# Patient Record
Sex: FEMALE | Race: WHITE
[De-identification: names, ages, dates, MRNs, and addresses within clinical notes are randomized per-mention and may not be internally consistent; named-entity substitution may affect disease eponyms.]

---
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
## 2019-01-10 ENCOUNTER — HOSPITAL ENCOUNTER (EMERGENCY)
Dept: HOSPITAL 25 - ED | Age: 19
Discharge: HOME | End: 2019-01-10
Payer: COMMERCIAL

## 2019-01-10 VITALS — SYSTOLIC BLOOD PRESSURE: 115 MMHG | DIASTOLIC BLOOD PRESSURE: 67 MMHG

## 2019-01-10 DIAGNOSIS — N12: Primary | ICD-10-CM

## 2019-01-10 DIAGNOSIS — R10.84: ICD-10-CM

## 2019-01-10 DIAGNOSIS — M54.9: ICD-10-CM

## 2019-01-10 LAB
ALBUMIN SERPL BCG-MCNC: 4.4 G/DL (ref 3.2–5.2)
ALBUMIN/GLOB SERPL: 1.8 {RATIO} (ref 1–3)
ALP SERPL-CCNC: 61 U/L (ref 34–104)
ALT SERPL W P-5'-P-CCNC: 17 U/L (ref 7–52)
ANION GAP SERPL CALC-SCNC: 5 MMOL/L (ref 2–11)
AST SERPL-CCNC: 18 U/L (ref 13–39)
BUN SERPL-MCNC: 10 MG/DL (ref 6–24)
BUN/CREAT SERPL: 14.3 (ref 8–20)
CALCIUM SERPL-MCNC: 9.2 MG/DL (ref 8.6–10.3)
CHLORIDE SERPL-SCNC: 106 MMOL/L (ref 101–111)
GLOBULIN SER CALC-MCNC: 2.4 G/DL (ref 2–4)
GLUCOSE SERPL-MCNC: 103 MG/DL (ref 70–100)
HCO3 SERPL-SCNC: 27 MMOL/L (ref 22–32)
HCT VFR BLD AUTO: 38 % (ref 35–47)
HGB BLD-MCNC: 13.1 G/DL (ref 12–16)
MCH RBC QN AUTO: 30 PG (ref 27–31)
MCHC RBC AUTO-ENTMCNC: 34 G/DL (ref 31–36)
MCV RBC AUTO: 87 FL (ref 80–97)
PLATELET # BLD AUTO: 238 10^3/UL (ref 150–450)
POTASSIUM SERPL-SCNC: 3.5 MMOL/L (ref 3.5–5)
PROT SERPL-MCNC: 6.8 G/DL (ref 6.4–8.9)
RBC # BLD AUTO: 4.39 10^6/UL (ref 4–5.4)
RBC UR QL AUTO: (no result)
SODIUM SERPL-SCNC: 138 MMOL/L (ref 135–145)
WBC # BLD AUTO: 6.4 10^3/UL (ref 3.5–10.8)
WBC UR QL AUTO: (no result)

## 2019-01-10 PROCEDURE — 81015 MICROSCOPIC EXAM OF URINE: CPT

## 2019-01-10 PROCEDURE — 99282 EMERGENCY DEPT VISIT SF MDM: CPT

## 2019-01-10 PROCEDURE — 85027 COMPLETE CBC AUTOMATED: CPT

## 2019-01-10 PROCEDURE — 87186 SC STD MICRODIL/AGAR DIL: CPT

## 2019-01-10 PROCEDURE — 36415 COLL VENOUS BLD VENIPUNCTURE: CPT

## 2019-01-10 PROCEDURE — 83605 ASSAY OF LACTIC ACID: CPT

## 2019-01-10 PROCEDURE — 80053 COMPREHEN METABOLIC PANEL: CPT

## 2019-01-10 PROCEDURE — 87086 URINE CULTURE/COLONY COUNT: CPT

## 2019-01-10 PROCEDURE — 87077 CULTURE AEROBIC IDENTIFY: CPT

## 2019-01-10 PROCEDURE — 81003 URINALYSIS AUTO W/O SCOPE: CPT

## 2019-01-10 NOTE — ED
Abdominal Pain/Female





- HPI Summary


HPI Summary: 


The patient is a 19 y/o F presenting to [facility] with a chief complaint of gradual 

onset right flank pain starting yesterday while she was on plane back from 

[place]. Two days ago, she had increased frequent foul-smelling urine, and it 

was cloudy. She then developed the flank pain and back pain that is worse on 

the left than the right. She denies dysuria and hematuria. The pain is 

currently rated 6/10 in severity. About two weeks ago, she had salmonella, with 

increased diarrhea that was treated with an antidiarrheal. Also about a month 

ago she had increased liver enzymes. She denies hx of UTI and kidney or bladder 

infections. No smoking or EtOH.





- History of Current Complaint


Chief Complaint: EDFlankPain


Stated Complaint: BACK AND ABD PAIN


Time Seen by Provider: 01/10/19 15:45


Hx Obtained From: Patient


Onset/Duration: Sudden Onset, Lasting Days - two, Still Present


Timing: Days


Severity Initially: Mild


Severity Currently: Moderate


Pain Intensity: 6


Pain Scale Used: 0-10 Numeric


Location: Flank - right


Radiates: No


Aggravating Factor(s): Nothing


Alleviating Factor(s): Nothing


Associated Signs and Symptoms: Positive: Back Pain - worse on left than right, 

Other: - POSITIVE: increased urinary frequency with foul-smelling urine; 

NEGATIVE: dysuria, hematuria


Allergies/Adverse Reactions: 


 Allergies











Allergy/AdvReac Type Severity Reaction Status Date / Time


 


No Known Allergies Allergy   Verified 01/10/19 15:27














PMH/Surg Hx/FS Hx/Imm Hx


Endocrine/Hematology History: 


   Denies: Hx Diabetes


Respiratory History: 


   Denies: Hx Chronic Obstructive Pulmonary Disease (COPD)


 History: 


   Denies: Hx Kidney Infection


Sensory History: 


   Denies: Hx Eye Prosthesis, Hx Legally Blind


Opthamlomology History: 


   Denies: Hx Eye Prosthesis, Hx Legally Blind





- Surgical History


Surgery Procedure, Year, and Place: none


Infectious Disease History: No


Infectious Disease History: Reports: Traveled Outside the [place] in Last 30 Days





- Family History


Known Family History: Positive: Cardiac Disease





- Social History


Occupation: Student


Lives: Dormitory/Roommates


Alcohol Use: Rare


Hx Substance Use: No


Substance Use Type: Reports: None


Hx Tobacco Use: No


Smoking Status (MU): Never Smoked Tobacco





Review of Systems


Negative: Fever, Chills


Negative: Erythema


Negative: Sore Throat


Negative: Chest Pain


Negative: Shortness Of Breath, Cough


Negative: Vomiting, Nausea


Positive: frequency - increased, flank pain - right flank, other - foul-

smelling urine.  Negative: dysuria, hematuria


Positive: Other - worse on left back but also on right.  Negative: Myalgia, 

Edema


Negative: Rash


Neurological: Other - NEGATIVE: dizziness


All Other Systems Reviewed And Are Negative: Yes





Physical Exam





- Summary


Physical Exam Summary: 


Constitutional: Well-developed, Well-nourished, Alert. (-) Distressed


Skin: Warm, Dry


HENT: Normocephalic; Atraumatic


Eyes: Conjunctiva normal


Neck: Musculoskeletal ROM normal neck. (-) JVD, (-) Stridor, (-) Tracheal 

deviation


Cardio: Rhythm regular, rate normal, Heart sounds normal; Intact distal pulses; 

The pedal pulses are 2+ and symmetric. Radial pulses are 2+ and symmetric. (-) 

Murmur


Pulmonary/Chest wall: Effort normal. (-) Respiratory distress, (-) Wheezes, (-) 

Rales


Abd: Soft, Mild right flank tenderness, (-) epigastric tenderness, (-) 

Distension, (-) Guarding, (-) Rebound


Musculoskeletal: (-) Edema, Mild TVA tenderness bilaterally


Lymph: (-) Cervical adenopathy


Neuro: Alert, Oriented x3


Psych: Mood and affect Normal


Triage Information Reviewed: Yes


Vital Signs On Initial Exam: 


 Initial Vitals











Temp Pulse Resp BP Pulse Ox


 


 98.1 F   75   14   126/79   100 


 


 01/10/19 15:22  01/10/19 15:22  01/10/19 15:22  01/10/19 15:22  01/10/19 15:22











Vital Signs Reviewed: Yes





Diagnostics





- Vital Signs


 Vital Signs











  Temp Pulse Resp BP Pulse Ox


 


 01/10/19 15:22  98.1 F  75  14  126/79  100














- Laboratory


Lab Results: 


 Lab Results











  01/10/19 01/10/19 01/10/19 Range/Units





  16:26 16:26 16:26 


 


WBC  6.4    (3.5-10.8)  10^3/ul


 


RBC  4.39    (4.00-5.40)  10^6/ul


 


Hgb  13.1    (12.0-16.0)  g/dl


 


Hct  38    (35-47)  %


 


MCV  87    (80-97)  fL


 


MCH  30    (27-31)  pg


 


MCHC  34    (31-36)  g/dl


 


RDW  13    (10.5-15)  %


 


Plt Count  238    (150-450)  10^3/ul


 


MPV  8.0    (7.4-10.4)  fL


 


Sodium   138   (135-145)  mmol/L


 


Potassium   3.5   (3.5-5.0)  mmol/L


 


Chloride   106   (101-111)  mmol/L


 


Carbon Dioxide   27   (22-32)  mmol/L


 


Anion Gap   5   (2-11)  mmol/L


 


BUN   10   (6-24)  mg/dL


 


Creatinine   0.70   (0.51-0.95)  mg/dL


 


Est GFR ( Amer)   130.4   (>60)  


 


Est GFR (Non-Af Amer)   107.8   (>60)  


 


BUN/Creatinine Ratio   14.3   (8-20)  


 


Glucose   103 H   ()  mg/dL


 


Lactic Acid    1.0  (0.5-2.0)  mmol/L


 


Calcium   9.2   (8.6-10.3)  mg/dL


 


Total Bilirubin   0.30   (0.2-1.0)  mg/dL


 


AST   18   (13-39)  U/L


 


ALT   17   (7-52)  U/L


 


Alkaline Phosphatase   61   ()  U/L


 


Total Protein   6.8   (6.4-8.9)  g/dL


 


Albumin   4.4   (3.2-5.2)  g/dL


 


Globulin   2.4   (2-4)  g/dL


 


Albumin/Globulin Ratio   1.8   (1-3)  


 


Urine Color     


 


Urine Appearance     


 


Urine pH     (5-9)  


 


Ur Specific Gravity     (1.010-1.030)  


 


Urine Protein     (Negative)  


 


Urine Ketones     (Negative)  


 


Urine Blood     (Negative)  


 


Urine Nitrate     (Negative)  


 


Urine Bilirubin     (Negative)  


 


Urine Urobilinogen     (Negative)  


 


Ur Leukocyte Esterase     (Negative)  


 


Urine WBC (Auto)     (Absent)  


 


Urine RBC (Auto)     (Absent)  


 


Ur Squamous Epith Cells     (Absent)  


 


Urine Bacteria     (Absent)  


 


Urine Glucose     (Negative)  














  01/10/19 Range/Units





  16:39 


 


WBC   (3.5-10.8)  10^3/ul


 


RBC   (4.00-5.40)  10^6/ul


 


Hgb   (12.0-16.0)  g/dl


 


Hct   (35-47)  %


 


MCV   (80-97)  fL


 


MCH   (27-31)  pg


 


MCHC   (31-36)  g/dl


 


RDW   (10.5-15)  %


 


Plt Count   (150-450)  10^3/ul


 


MPV   (7.4-10.4)  fL


 


Sodium   (135-145)  mmol/L


 


Potassium   (3.5-5.0)  mmol/L


 


Chloride   (101-111)  mmol/L


 


Carbon Dioxide   (22-32)  mmol/L


 


Anion Gap   (2-11)  mmol/L


 


BUN   (6-24)  mg/dL


 


Creatinine   (0.51-0.95)  mg/dL


 


Est GFR ( Amer)   (>60)  


 


Est GFR (Non-Af Amer)   (>60)  


 


BUN/Creatinine Ratio   (8-20)  


 


Glucose   ()  mg/dL


 


Lactic Acid   (0.5-2.0)  mmol/L


 


Calcium   (8.6-10.3)  mg/dL


 


Total Bilirubin   (0.2-1.0)  mg/dL


 


AST   (13-39)  U/L


 


ALT   (7-52)  U/L


 


Alkaline Phosphatase   ()  U/L


 


Total Protein   (6.4-8.9)  g/dL


 


Albumin   (3.2-5.2)  g/dL


 


Globulin   (2-4)  g/dL


 


Albumin/Globulin Ratio   (1-3)  


 


Urine Color  Yellow  


 


Urine Appearance  Cloudy  


 


Urine pH  6.0  (5-9)  


 


Ur Specific Gravity  1.013  (1.010-1.030)  


 


Urine Protein  Negative  (Negative)  


 


Urine Ketones  Negative  (Negative)  


 


Urine Blood  2+ A  (Negative)  


 


Urine Nitrate  Positive A  (Negative)  


 


Urine Bilirubin  Negative  (Negative)  


 


Urine Urobilinogen  Negative  (Negative)  


 


Ur Leukocyte Esterase  1+ A  (Negative)  


 


Urine WBC (Auto)  3+(>20/hpf) A  (Absent)  


 


Urine RBC (Auto)  3+(>10/hpf) A  (Absent)  


 


Ur Squamous Epith Cells  Present A  (Absent)  


 


Urine Bacteria  3+ A  (Absent)  


 


Urine Glucose  Negative  (Negative)  











Result Diagrams: 


 01/10/19 16:26





 01/10/19 16:26


Lab Statement: Any lab studies that have been ordered have been reviewed, and 

results considered in the medical decision making process.





Re-Evaluation





- Re-Evaluation


  ** First Eval


Re-Evaluation Time: 17:30


Change: Unchanged


Comment: I spoke with the patient concering discharge home and follow up at 

[facility].





Abdominal Pain Fem Course/Dx





- Course


Course Of Treatment: The patient is a 19 y/o F presenting to [facility] with a chief 

complaint of gradual onset right flank pain starting yesterday. Two days ago, 

she had increased frequent foul-smelling urine, and it was cloudy. She then 

developed the flank pain and back pain that is worse on the left than the 

right. She denies dysuria and hematuria. About two weeks ago, she had salmonella

, with increased diarrhea that was treated with an antidiarrheal. Also about a 

month ago she had increased liver enzymes. She denies hx of UTI and kidney or 

bladder infections. Upon physical exam, the patient exhibits mild TVA 

tenderness bilaterally and mild right flank tenderness. In the ED course, the 

patient was given Ibuprofen and Bactrim. Bloodwork is unremarkable. UA reveals 

blood, nitrate, leukocytes, WBCs, RBCs, and bacteria. With these results, the 

patient is diagnosed with pyelonephritis. She will be discharged home with 

prescription for Bactrim, education instructions, and follow up with [facility] in 1-2 days. She agrees with this plan and understands the need for 

return to the ED if symptoms worsen.





- Diagnoses


Provider Diagnoses: 


 Pyelonephritis








Discharge





- Sign-Out/Discharge


Documenting (check all that apply): Patient Departure - Patient will be 

discharged home.





- Discharge Plan


Condition: Stable


Disposition: HOME


Prescriptions: 


Sulfamethox/Trimethoprim DS* [Bactrim /160 TAB*] 1 tab PO BID #14 tab


Patient Education Materials:  Kidney Infection (ED)


Print Language: ENGLISH


Referrals: 


Andres Spears MD [Primary Care Provider] - 


[facility] [Provider Group] - 2 Days


Additional Instructions: 


Please take medication as prescribed. 





Follow up with [facility] in 1-2 days.





RETURN TO THE EMERGENCY DEPARTMENT FOR ANY NEW OR WORSENING SYMPTOMS.





- Billing Disposition and Condition


Condition: STABLE


Disposition: Home





- Attestation Statements


Document Initiated by Calvin: Yes


Documenting Scribe: Apple Blair


Provider For Whom Calvin is Documenting (Include Credential): Dr. Lexx Arana MD


Scribe Attestation: 


Apple LUNA scribed for Dr. Lexx Arana MD on 01/10/19 at 2053. 


Scribe Documentation Reviewed: Yes


Provider Attestation: 


The documentation as recorded by the Apple henry accurately reflects 

the service I personally performed and the decisions made by me, Dr. Lexx Arana MD


Status of Maurizioibshilpa Document: Viewed